# Patient Record
Sex: MALE | Race: WHITE | URBAN - METROPOLITAN AREA
[De-identification: names, ages, dates, MRNs, and addresses within clinical notes are randomized per-mention and may not be internally consistent; named-entity substitution may affect disease eponyms.]

---
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
## 2023-03-20 NOTE — PROGRESS NOTES
"Dimension 1 to 6  D) Met with client 25 minute one to one to go over weekly treatment plan review. Client reporting sleep remains the primary issue. He does not feel he is getting enough sleep. He reports difficulty falling and staying asleep. He states that if his sleep improved he believes his urges to use would be less and he would have a more positive focus. He agreed to a UA. He reports Mood 6 out of 10, Energy 6 out of 10, anxiety 5 out of 10, thinking/concentration 6 out of 10, sleep 3 out of 10 and over all 5 out of 10. He states his urges to use are a 6 out of 10. He did state he would be willing to talk with Mariely (provider) about sleep medication. When asked about willingness to attend recovery meetings or focus his thinking on positive interactions he is having or emotions he is experiencing he states he is not willing to attend any meetings \"they aren't for me\" and he is not open to thinking more positively.  Client did state he did not take medications consistently when he was on vacation. This may have something to do with the tone of the one to one as well as poor sleep.  I) Asked questions, made suggestions about completing tasks and noticing more positives .  A) Client seems distracted today. He does tend to focus on the negative and can bring himself down by doing this. Lack of sleep hygiene is making him vulnerable to emotions.  P) Schedule a family meeting.  "
Behavioral Health  Note   Behavioral Health  Spirituality Group Note     [facility]    Name: Lars Millan    YOB: 2000   MRN: [id number]    Age: 17 year old     Patient attended -led group, which included discussion of spirituality, coping with illness and building resilience.   Patient attended group for 1 hrs.   The patient actively participated in group discussion and patient demonstrated an appreciation of topic's application for their personal circumstances.     Fercho Mary, [facility]   Staff    Pager [phone]      
Benefits, risks, and possible complications of procedure explained to patient/caregiver who verbalized understanding and gave verbal consent.